# Patient Record
Sex: MALE | Race: WHITE | NOT HISPANIC OR LATINO | URBAN - METROPOLITAN AREA
[De-identification: names, ages, dates, MRNs, and addresses within clinical notes are randomized per-mention and may not be internally consistent; named-entity substitution may affect disease eponyms.]

---

## 2022-11-21 PROBLEM — F31.32 BIPOLAR I DISORDER, MOST RECENT EPISODE DEPRESSED, MODERATE (HCC): Status: ACTIVE | Noted: 2022-11-21

## 2022-11-21 PROBLEM — F17.200 TOBACCO USE DISORDER: Status: ACTIVE | Noted: 2022-11-21

## 2022-11-21 PROBLEM — F41.1 GENERALIZED ANXIETY DISORDER: Status: ACTIVE | Noted: 2022-11-21

## 2022-11-21 RX ORDER — QUETIAPINE FUMARATE 200 MG/1
200 TABLET, FILM COATED ORAL
COMMUNITY
End: 2022-11-23 | Stop reason: SDUPTHER

## 2022-11-21 RX ORDER — CLONAZEPAM 2 MG/1
2 TABLET ORAL 2 TIMES DAILY
COMMUNITY
End: 2022-11-23 | Stop reason: SDUPTHER

## 2022-11-23 ENCOUNTER — TELEMEDICINE (OUTPATIENT)
Dept: PSYCHIATRY | Facility: CLINIC | Age: 52
End: 2022-11-23

## 2022-11-23 DIAGNOSIS — F17.200 TOBACCO USE DISORDER: ICD-10-CM

## 2022-11-23 DIAGNOSIS — F41.1 GENERALIZED ANXIETY DISORDER: ICD-10-CM

## 2022-11-23 DIAGNOSIS — F31.32 BIPOLAR I DISORDER, MOST RECENT EPISODE DEPRESSED, MODERATE (HCC): Primary | ICD-10-CM

## 2022-11-23 RX ORDER — QUETIAPINE FUMARATE 200 MG/1
TABLET, FILM COATED ORAL
Qty: 180 TABLET | Refills: 1 | Status: SHIPPED | OUTPATIENT
Start: 2022-11-23

## 2022-11-23 RX ORDER — CLONAZEPAM 2 MG/1
TABLET ORAL
Qty: 90 TABLET | Refills: 4 | Status: SHIPPED | OUTPATIENT
Start: 2022-11-23

## 2022-11-23 NOTE — BH TREATMENT PLAN
TREATMENT PLAN (Medication Management Only)        [facility]    Name and Date of Birth:  Lauralyn Bernheim 45 y o  1970  Date of Treatment Plan: November 23, 2022  Diagnosis/Diagnoses:    1  Bipolar I disorder, most recent episode depressed, moderate ([address] )    2  Generalized anxiety disorder    3  Tobacco use disorder      Strengths/Personal Resources for Self-Care: supportive family, supportive friends, taking medications as prescribed, ability to adapt to life changes, ability to communicate needs, ability to communicate well, ability to listen, ability to reason, ability to understand psychiatric illness, average or above intelligence  Area/Areas of need (in own words): mood swings  1  Long Term Goal: maintain mood stability  Target Date:6 months - 5/23/2023  Person/Persons responsible for completion of goal: Yusuf  2  Short Term Objective (s) - How will we reach this goal?:   A  Provider new recommended medication/dosage changes and/or continue medication(s): continue current medications as prescribed Seroquel, Klonopin  B  N/A   C  N/A  Target Date:6 months - 5/23/2023  Person/Persons Responsible for Completion of Goal: Kelly Sanders  Progress Towards Goals: stable  Treatment Modality: medication management every 4 months  Review due 180 days from date of this plan: 6 months - 5/23/2023  Expected length of service: maintenance  My Physician/PA/NP and I have developed this plan together and I agree to work on the goals and objectives  I understand the treatment goals that were developed for my treatment

## 2022-11-23 NOTE — PATIENT INSTRUCTIONS
Continue Current Tx  This was via phone as he could not properly connect to [address] [place] [address]  Report Problems  Return 4 months

## 2023-03-15 ENCOUNTER — TELEMEDICINE (OUTPATIENT)
Dept: PSYCHIATRY | Facility: CLINIC | Age: 53
End: 2023-03-15

## 2023-03-15 DIAGNOSIS — F17.200 TOBACCO USE DISORDER: ICD-10-CM

## 2023-03-15 DIAGNOSIS — F31.32 BIPOLAR I DISORDER, MOST RECENT EPISODE DEPRESSED, MODERATE (HCC): ICD-10-CM

## 2023-03-15 DIAGNOSIS — F41.1 GENERALIZED ANXIETY DISORDER: Primary | ICD-10-CM

## 2023-03-15 RX ORDER — QUETIAPINE FUMARATE 200 MG/1
TABLET, FILM COATED ORAL
Qty: 180 TABLET | Refills: 1 | Status: SHIPPED | OUTPATIENT
Start: 2023-03-15

## 2023-03-15 RX ORDER — CLONAZEPAM 2 MG/1
TABLET ORAL
Qty: 90 TABLET | Refills: 4 | Status: SHIPPED | OUTPATIENT
Start: 2023-03-15

## 2023-03-15 NOTE — PSYCH
Virtual Regular Visit    Verification of patient location: at home    Patient is located in the following state in which I hold an active license [place]      Assessment/Plan:       Diagnoses and all orders for this visit:    Generalized anxiety disorder  -     clonazePAM (KlonoPIN) 2 mg tablet; Take 1 PO TID  -     QUEtiapine (SEROquel) 200 mg tablet; Take 1 PO BID    Bipolar I disorder, most recent episode depressed, moderate (HCC)  -     clonazePAM (KlonoPIN) 2 mg tablet; Take 1 PO TID  -     QUEtiapine (SEROquel) 200 mg tablet; Take 1 PO BID    Tobacco use disorder          Goals addressed in session:   Good Health  Counseling provided:      Treatment Recommendations- Risks Benefits       Immediate Medical/Psychiatric/Psychotherapy Treatments and Any Precautions:     Risks, Benefits And Possible Side Effects Of Medications:  Risks, benefits, and possible side effects of medications explained to patient and patient verbalizes understanding    Controlled Medication Discussion: The patient has been filling controlled prescriptions on time as prescribed to [facility]      Reason for visit is No chief complaint on file  Medication Management     Encounter provider NIC Claudio    Provider located at [address] [place] [address]  [phone]      Recent Visits  No visits were found meeting these conditions  Showing recent visits within past 7 days and meeting all other requirements  Today's Visits  Date Type Provider Dept   03/15/23 Telemedicine Kaycee Freedman [facility] today's visits and meeting all other requirements  Future Appointments  No visits were found meeting these conditions  Showing future appointments within next 150 days and meeting all other requirements       The patient was identified by name and date of birth   Estephania Recinos was informed that this is a telemedicine visit and that [facility]  He agrees to proceed     My office door was closed  No one else was in the room  He acknowledged consent and understanding of privacy and security of the video platform  The patient has agreed to participate and understands they can discontinue the visit at any time  Patient is aware this is a billable service  Subjective    Andrew Bae is a 45 y o  male    Here today for a med check  This was via Amwell  normal appetite      HPI  Mood good  Stable   Anxiety manageable  No problems with medication  When asked any ETOH he replied "I am fine"    Appetite Sleep good  Health OK  Denies SI/HI    No past medical history on file  No past surgical history on file  Current Outpatient Medications   Medication Sig Dispense Refill   • clonazePAM (KlonoPIN) 2 mg tablet Take 1 PO TID 90 tablet 4   • QUEtiapine (SEROquel) 200 mg tablet Take 1 PO  tablet 1     No current facility-administered medications for this visit  Not on File    Social History     Substance and Sexual Activity   Drug Use Not on file       No family history on file          Objective    Mental status:  Appearance calm and cooperative , adequate hygiene and grooming and good eye contact    Mood mood appropriate   Affect affect appropriate    Speech a normal rate and fluent   Thought Processes normal thought processes   Hallucinations no hallucinations present    Thought Content no delusions   Abnormal Thoughts no suicidal thoughts  and no homicidal thoughts    Orientation  oriented to person and place and time   Remote Memory short term memory intact and long term memory intact   Attention Span concentration intact   Intellect Appears to be of Average Intelligence   Insight Insight intact   Judgement judgment was intact   Muscle Strength Muscle strength and tone were normal and Normal gait    Language no difficulty naming common objects   Fund of Knowledge displays adequate knowledge of current events   Pain none   Pain Scale 0       Video Exam    There were no vitals filed for this visit      I spent 15 minutes directly with the patient during this visit    Patient Instructions   Continue Current Tx  Report Problems  Return 4 months       Visit Time    Visit Start Time: 8154  Visit Stop Time: 0818  Total Visit Duration: 12 min

## 2023-03-15 NOTE — BH TREATMENT PLAN
TREATMENT PLAN (Medication Management Only)        [facility]    Name and Date of Birth:  Miesha Jaeger 45 y o  1970  Date of Treatment Plan: March 15, 2023  Diagnosis/Diagnoses:    1  Generalized anxiety disorder    2  Bipolar I disorder, most recent episode depressed, moderate ([address] )    3  Tobacco use disorder      Strengths/Personal Resources for Self-Care: supportive family, supportive friends, taking medications as prescribed  Area/Areas of need (in own words): mood swings  1  Long Term Goal: maintain mood stability  Target Date:6 months - 9/15/2023  Person/Persons responsible for completion of goal: Malvin Bhandari, family  2  Short Term Objective (s) - How will we reach this goal?:   A  Provider new recommended medication/dosage changes and/or continue medication(s): continue current medications as prescribed Seroquel, Klonopin  B  N/A   C  N/A  Target Date:6 months - 9/15/2023  Person/Persons Responsible for Completion of Goal: Malvin Bhandari, family  Progress Towards Goals: stable  Treatment Modality: medication management every 4 months  Review due 180 days from date of this plan: 6 months - 9/15/2023  Expected length of service: maintenance  My Physician/PA/NP and I have developed this plan together and I agree to work on the goals and objectives  I understand the treatment goals that were developed for my treatment

## 2023-06-13 ENCOUNTER — DOCUMENTATION (OUTPATIENT)
Dept: PSYCHIATRY | Facility: CLINIC | Age: 53
End: 2023-06-13

## 2023-06-13 NOTE — PSYCH
[facility]    Patient Name Lex Joy     Date of Birth: 45 y o  1970      MRN: [id number]    Admission Date: several years ago    Date of Transfer: June 13, 2023    Admission Diagnosis:     Bipolar Disorder, type I, depressed, moderate  Generalized Anxiety Disorder    Current Diagnosis:     No diagnosis found  Reason for Admission: Modesto Leslie presented for treatment due to anxiety and mood instability  Primary complaints included ANXIETY SYMPTOMS: unremarkable and MOOD INSTABILITY SYMPTOMS: unremarkable  Progress in Treatment: Modesto Leslie was seen for Medication Management  During the course of treatment he      Episodes of Higher Level of Care: No    Transfer request Initiated by: Psychiatrist: Nurse Practitioner Porter Segal Therapist: None    Reason for Transfer Request: clinician leaving practice    Does this individual need a clinician with specialized training/expertise?: No    Is this client working with any other [facility] Providers/Therapists?  Psychiatrist: Nurse Practitioner Porter Segal Therapist: None    Other pertinent issues: None    Are there any specific individuals who would be a “best fit” or who have already agreed to accept this transfer request?      Psychiatrist: None   Therapist: None  Rationale: Not Applicable    Attempts to maintain the current therapeutic relationship: Not Applicable    Transfer request routed to Clinical Coordinator for input and/or approval      Comments from other involved providers and/or clinical coordinator: None    Porter Segal, CRNP06/13/23

## 2023-06-16 ENCOUNTER — TELEPHONE (OUTPATIENT)
Dept: PSYCHIATRY | Facility: CLINIC | Age: 53
End: 2023-06-16

## 2023-06-16 NOTE — TELEPHONE ENCOUNTER
Contacted client about cancelling 7/5/2023 appt  with NIC Blanton due to him retiring  We are in the process of hiring new providers within our practice  Once we have their schedules, we will contact you to reschedule your appt  Please call [phone] to request refills as needed

## 2023-06-21 ENCOUNTER — TELEMEDICINE (OUTPATIENT)
Dept: PSYCHIATRY | Facility: CLINIC | Age: 53
End: 2023-06-21

## 2023-06-21 ENCOUNTER — DOCUMENTATION (OUTPATIENT)
Dept: PSYCHIATRY | Facility: CLINIC | Age: 53
End: 2023-06-21

## 2023-06-21 DIAGNOSIS — F31.32 BIPOLAR I DISORDER, MOST RECENT EPISODE DEPRESSED, MODERATE (HCC): ICD-10-CM

## 2023-06-21 DIAGNOSIS — F41.1 GENERALIZED ANXIETY DISORDER: Primary | ICD-10-CM

## 2023-06-21 DIAGNOSIS — F17.200 TOBACCO USE DISORDER: ICD-10-CM

## 2023-06-21 PROCEDURE — 99214 OFFICE O/P EST MOD 30 MIN: CPT | Performed by: NURSE PRACTITIONER

## 2023-06-21 RX ORDER — QUETIAPINE FUMARATE 200 MG/1
TABLET, FILM COATED ORAL
Qty: 180 TABLET | Refills: 1 | Status: SHIPPED | OUTPATIENT
Start: 2023-06-21

## 2023-06-21 RX ORDER — CLONAZEPAM 2 MG/1
TABLET ORAL
Qty: 90 TABLET | Refills: 5 | Status: SHIPPED | OUTPATIENT
Start: 2023-06-21

## 2023-06-21 NOTE — PSYCH
Virtual Regular Visit    Verification of patient location: at home    Patient is located in the following state in which I hold an active license [place]      Assessment/Plan:       Diagnoses and all orders for this visit:    Generalized anxiety disorder  -     QUEtiapine (SEROquel) 200 mg tablet; Take 1 PO BID  -     clonazePAM (KlonoPIN) 2 mg tablet; Take 1 PO TID    Bipolar I disorder, most recent episode depressed, moderate (HCC)  -     QUEtiapine (SEROquel) 200 mg tablet; Take 1 PO BID  -     clonazePAM (KlonoPIN) 2 mg tablet; Take 1 PO TID    Tobacco use disorder            Goals addressed in session:   Good Health  Counseling provided:      Treatment Recommendations- Risks Benefits       Immediate Medical/Psychiatric/Psychotherapy Treatments and Any Precautions:     Risks, Benefits And Possible Side Effects Of Medications:  Risks, benefits, and possible side effects of medications explained to patient and patient verbalizes understanding    Controlled Medication Discussion: The patient has been filling controlled prescriptions on time as prescribed to [facility]      Reason for visit is No chief complaint on file  Medication Management    Encounter provider NIC Lomeli    Provider located at [address] [address]  [phone]      Recent Visits  Date Type Provider Dept   06/16/23 Telephone [facility] recent visits within past 7 days and meeting all other requirements  Today's Visits  Date Type Provider Dept   06/21/23 Telemedicine [facility] today's visits and meeting all other requirements  Future Appointments  No visits were found meeting these conditions    Showing future appointments within next 150 days and meeting all other requirements       The patient was identified by name and date of birth  Francesca Gordon was informed that this is a telemedicine visit and that the [facility]  He agrees to proceed     My office door was closed  No one else was in the room  He acknowledged consent and understanding of privacy and security of the video platform  The patient has agreed to participate and understands they can discontinue the visit at any time  Patient is aware this is a billable service  Emelyn Gordon is a 45 y o  male    Here today for a med check  This was via [facility] and the phone    normal appetite      HPI  Mood good  Denies anxiety   No problems with medication  Appetite Sleep good  Health OK  Denies SI/HI    No past medical history on file  No past surgical history on file  Current Outpatient Medications   Medication Sig Dispense Refill   • clonazePAM (KlonoPIN) 2 mg tablet Take 1 PO TID 90 tablet 5   • QUEtiapine (SEROquel) 200 mg tablet Take 1 PO  tablet 1     No current facility-administered medications for this visit  Not on File    Social History     Substance and Sexual Activity   Drug Use Not on file       No family history on file          Objective    Mental status:  Appearance calm and cooperative  and adequate hygiene and grooming   Mood mood appropriate   Affect affect appropriate    Speech a normal rate and fluent   Thought Processes normal thought processes   Hallucinations no hallucinations present    Thought Content no delusions   Abnormal Thoughts no suicidal thoughts  and no homicidal thoughts    Orientation  oriented to person and place and time   Remote Memory short term memory intact and long term memory intact   Attention Span concentration intact   Intellect Appears to be of Average Intelligence   Insight Insight intact   Judgement judgment was intact   Muscle Strength Muscle strength and tone were normal and Normal gait    Language no difficulty naming common objects   Fund of Knowledge displays adequate knowledge of current events   Pain none   Pain Scale 0       Video Exam    There were no vitals filed for this visit       I spent 15 minutes directly with the patient during this visit    Patient Instructions   Continue Current Tx  Report Problems  Given # of ACM Team to help with Neisha Rosales and My Chart  Return 4 months       Visit Time    Visit Start Time: 3519  Visit Stop Time: 0926  Total Visit Duration: 22 min

## 2023-06-21 NOTE — BH TREATMENT PLAN
TREATMENT PLAN (Medication Management Only)        [facility]    Name and Date of Birth:  Ladonna Lujan 45 y o  1970  Date of Treatment Plan: June 21, 2023  Diagnosis/Diagnoses:    1  Generalized anxiety disorder    2  Bipolar I disorder, most recent episode depressed, moderate ([address] )    3  Tobacco use disorder      Strengths/Personal Resources for Self-Care: supportive family, supportive friends, taking medications as prescribed  Area/Areas of need (in own words): anxiety, mood instability  1  Long Term Goal: maintain mood stability  Target Date:6 months - 12/21/2023  Person/Persons responsible for completion of goal: Yusuf  2  Short Term Objective (s) - How will we reach this goal?:   A  Provider new recommended medication/dosage changes and/or continue medication(s): continue current medications as prescribed Seroquel, Klonopin  B  N/A   C  N/A  Target Date:6 months - 12/21/2023  Person/Persons Responsible for Completion of Goal: Nettie Elizondo  Progress Towards Goals: stable  Treatment Modality: medication management every 4 months  Review due 180 days from date of this plan: 6 months - 12/21/2023  Expected length of service: maintenance  My Physician/PA/NP and I have developed this plan together and I agree to work on the goals and objectives  I understand the treatment goals that were developed for my treatment

## 2023-06-21 NOTE — PATIENT INSTRUCTIONS
Continue Current Tx  Report Problems  Given # of ACM Team to help with Amwell and My Chart  Return 4 months

## 2023-06-21 NOTE — PSYCH
[facility]    Patient Name Kailyn Garzon     Date of Birth: 45 y o  1970      MRN: [id number]    Admission Date: several years ago    Date of Transfer: June 21, 2023    Admission Diagnosis:     Bipolar  Most recent depressed moderate  Generalized Anxiety Disorder    Current Diagnosis:     No diagnosis found  Reason for Admission: Cathleen Gallo presented for treatment due to anxiety and mood instability  Primary complaints included DEPRESSIVE SYMPTOMS: unremarkable - euthymic mood and MOOD INSTABILITY SYMPTOMS: unremarkable  Progress in Treatment: Cathleen Gallo was seen for Medication Management  During the course of treatment he reports mood and anxiety good  Episodes of Higher Level of Care: No    Transfer request Initiated by: Psychiatrist: Nurse Practitioner Azul Lopez Therapist: None    Reason for Transfer Request: clinician leaving practice    Does this individual need a clinician with specialized training/expertise?: No    Is this client working with any other [facility] Providers/Therapists?  Psychiatrist: None Therapist: None    Other pertinent issues: None    Are there any specific individuals who would be a “best fit” or who have already agreed to accept this transfer request?      Psychiatrist: None   Therapist: None  Rationale: Not Applicable    Attempts to maintain the current therapeutic relationship: Not Applicable    Transfer request routed to Clinical Coordinator for input and/or approval      Comments from other involved providers and/or clinical coordinator: None    Azul Lopez, CRNP06/21/23

## 2023-08-07 ENCOUNTER — TELEPHONE (OUTPATIENT)
Dept: PSYCHIATRY | Facility: CLINIC | Age: 53
End: 2023-08-07

## 2023-08-07 DIAGNOSIS — F31.32 BIPOLAR I DISORDER, MOST RECENT EPISODE DEPRESSED, MODERATE (HCC): ICD-10-CM

## 2023-08-07 DIAGNOSIS — F41.1 GENERALIZED ANXIETY DISORDER: ICD-10-CM

## 2023-08-07 RX ORDER — QUETIAPINE FUMARATE 200 MG/1
TABLET, FILM COATED ORAL
Qty: 30 TABLET | Refills: 1 | Status: SHIPPED | OUTPATIENT
Start: 2023-08-07 | End: 2023-08-16 | Stop reason: SDUPTHER

## 2023-08-07 NOTE — TELEPHONE ENCOUNTER
Pt .Tala Baird, 1970 , Please call Gabrielle Cain to schedule appointment at [address] [place] [address] within the next 2 months.       [facility]     No, This note was not shared with the patient due to reasonable likelihood of causing patient harm

## 2023-08-07 NOTE — TELEPHONE ENCOUNTER
Pharmacy reporting that seroquel script was canceled. Called pharmacy to discuss. They state they received a cancellation on his seroquel and never rec'd the script that was sent on 6/21. Provider has since retired. Forwarding for review.

## 2023-08-07 NOTE — TELEPHONE ENCOUNTER
Clt called pharmacy to initiate refill for Seroquel. States pharmacy told him that prescription has been cancelled by prescriber. Confused as to why the pharmacy told him this. Writer advised she will send message to clinical team to look into medication refill of Seroquel. Clt requested a call back to update what is going on.

## 2023-08-16 NOTE — TELEPHONE ENCOUNTER
This was filled as daily but per Ángel's last note, he takes it bid    Script corrected, can you sign off on it please

## 2023-08-17 DIAGNOSIS — F31.32 BIPOLAR I DISORDER, MOST RECENT EPISODE DEPRESSED, MODERATE (HCC): ICD-10-CM

## 2023-08-17 DIAGNOSIS — F41.1 GENERALIZED ANXIETY DISORDER: ICD-10-CM

## 2023-08-17 RX ORDER — QUETIAPINE FUMARATE 200 MG/1
TABLET, FILM COATED ORAL
Qty: 60 TABLET | Refills: 1 | OUTPATIENT
Start: 2023-08-17

## 2023-08-17 RX ORDER — QUETIAPINE FUMARATE 200 MG/1
TABLET, FILM COATED ORAL
Qty: 60 TABLET | Refills: 1 | Status: SHIPPED | OUTPATIENT
Start: 2023-08-17

## 2023-08-25 ENCOUNTER — TELEPHONE (OUTPATIENT)
Dept: PSYCHIATRY | Facility: CLINIC | Age: 53
End: 2023-08-25

## 2023-08-25 NOTE — TELEPHONE ENCOUNTER
Spoke to client regarding scheduling with a new provider. Erik has insurance through another county, which we do not except. Jasont stated that he paid privately for his last apt. Sent client a link to set up a my chart. Clt using mom's email address as he doesn't have one. Sent IB message to billing to review that we are able to see this client, due to circumstances with insurance.

## 2023-10-06 ENCOUNTER — TELEMEDICINE (OUTPATIENT)
Dept: PSYCHIATRY | Facility: CLINIC | Age: 53
End: 2023-10-06

## 2023-10-06 DIAGNOSIS — F41.1 GENERALIZED ANXIETY DISORDER: ICD-10-CM

## 2023-10-06 DIAGNOSIS — F31.32 BIPOLAR I DISORDER, MOST RECENT EPISODE DEPRESSED, MODERATE (HCC): Primary | ICD-10-CM

## 2023-10-06 PROCEDURE — 99214 OFFICE O/P EST MOD 30 MIN: CPT | Performed by: PSYCHIATRY & NEUROLOGY

## 2023-10-06 RX ORDER — QUETIAPINE FUMARATE 200 MG/1
200 TABLET, FILM COATED ORAL 2 TIMES DAILY
Qty: 60 TABLET | Refills: 2 | Status: SHIPPED | OUTPATIENT
Start: 2023-10-06

## 2023-10-06 RX ORDER — CLONAZEPAM 2 MG/1
2 TABLET ORAL 3 TIMES DAILY
Qty: 90 TABLET | Refills: 2 | Status: SHIPPED | OUTPATIENT
Start: 2023-10-06

## 2023-10-06 NOTE — PSYCH
This was a Telepsychiatry visit that took place through [address] [place] [address]. The patient's identity was verified via name and date of birth. Patient expressed understanding, and voluntarily agreed to proceed with the visit. This visit occurred at the address listed in the patient's chart as the patient's residence. Chief Complaint:    Bipolar depression and anxiety    Interval Summary    Pt said that he has been doing well since his last visit few months ago. He denied any depression or anxiety for the last few weeks. He denied any stressors currently. He said he has not worked since 2010 and is on disability. He lives with his mother. He said that he goes to the gym, watch TV, and helps mom with stuff around the house. He does PT twice a week for back pain. Psychiatric ROS    Sleep: Good  Appetite: Good  Suicidal ideation: Denied  Homicidal ideation: Denied  Psychosis: None reported  Nelly: None reported  PTSD: None reported  Panic attacks: None reported  Memory issues: None reported  Side effects: None reported    D+A Use    Alcohol: None  Nicotine: Smokes 1/2 pack of cigarettes since he was 13years old  Other substances: None    Medical Issues  Acute medical issues at present: None  Last PCP Visit: 1 month ago, no issues    Medical Review of Systems    No fever/dizziness/blurred vision/cough/chest pain/shortness of breath/abdominal pain/nausea/vomiting/bladder or bowel problems/headache/weakness/rigidity or pain reported. Any symptoms reported by patient are listed in the interval summary.     Mental Status Exam    Motor: Normal gait and no abnormal movements  General appearance and behavior: Moderately kempt, calm, cooperative, pleasant, good eye contact, good rapport, no psychomotor abnormalities noted  Speech: Spontaneous with normal rate, normal volume and normal tone  Mood: "good"  Affect: Euthymic, congruent with mood, normal range, appropriate  Thought Process: linear  Thought content: Denied suicidal or homicidal ideation. No delusions elicited  Perception: Denied any auditory or visual hallucinations  Insight: Good  Judgment: Good  Orientation: Oriented to person, place and time  Attention/Concentration: Good  Memory - Grossly intact  Language - [address] of Knowledge - Adequate    Assessment and Plan    Bipolar I disorder, most recent episode depressed, moderate - Continue quetiapine 200 mg tablet PO BID    Generalized anxiety disorder - Continue clonazepam 2 mg PO TID    D/w pt about the risks of dependence, tolerance with klonopin and also the risk of falls and imbalance. He agreed to reduce the dose slowly.       The clinical diagnosis, course and prognosis were explained to the patient. Discussed with patient the clinical indications, interactions, benefits, the most common and serious side effects of all current medications. The alternative treatment options were discussed. The importance of continuing in psychotherapy was reinstated. The patient was receptive and appeared to understand the information provided. Patient's concerns and questions were addressed to patient's satisfaction during the appointment, and the patient agreed to the treatment plan. Patient is aware of adverse effects of use of alcohol and/or other substances on mental illness and is aware of the risk of combining alcohol and/or substances with the medications that are currently prescribed. The patient was advised go to the nearest emergency room or call 911 if new symptoms arise or existing symptoms worsen or has thoughts of hurting self or others.     Follow up in 8 weeks

## 2023-12-08 ENCOUNTER — TELEMEDICINE (OUTPATIENT)
Dept: PSYCHIATRY | Facility: CLINIC | Age: 53
End: 2023-12-08

## 2023-12-08 DIAGNOSIS — F17.200 TOBACCO USE DISORDER: ICD-10-CM

## 2023-12-08 DIAGNOSIS — F31.32 BIPOLAR I DISORDER, MOST RECENT EPISODE DEPRESSED, MODERATE (HCC): Primary | ICD-10-CM

## 2023-12-08 DIAGNOSIS — F41.1 GENERALIZED ANXIETY DISORDER: ICD-10-CM

## 2023-12-08 PROCEDURE — 99213 OFFICE O/P EST LOW 20 MIN: CPT | Performed by: PSYCHIATRY & NEUROLOGY

## 2023-12-08 RX ORDER — QUETIAPINE FUMARATE 200 MG/1
200 TABLET, FILM COATED ORAL 2 TIMES DAILY
Qty: 60 TABLET | Refills: 3 | Status: SHIPPED | OUTPATIENT
Start: 2023-12-08

## 2023-12-08 RX ORDER — CLONAZEPAM 2 MG/1
2 TABLET ORAL 3 TIMES DAILY
Qty: 90 TABLET | Refills: 3 | Status: SHIPPED | OUTPATIENT
Start: 2023-12-08

## 2023-12-08 NOTE — PSYCH
After connecting through [employer], patient was verified with two unique identifiers. Patient (or authorized legal representative) was then informed that this was a Telemedicine visit and that the exam was being conducted confidentially over secure lines. My office door was closed. No one else was in the room with me. Patient acknowledged consent and understanding of privacy and security of the Telemedicine visit, and gave permission to have a telemedicine presenter stay in the room in order to assist with the history and to conduct the exam as needed. I informed the patient that I have reviewed their record in Epic and presented the opportunity for them to ask any questions regarding the visit today. The patient agreed to participate. Chief Complaint:    Bipolar depression and anxiety    Interval Summary    Pt said that he has been doing well since his last visit. He denied any depression or anxiety. He said they had their roof replaced 2 days ago and that was stressful. He denied any other stressors. Psychiatric ROS    Sleep: Good  Appetite: Good  Suicidal ideation: Denied  Homicidal ideation: Denied  Psychosis: None reported  Nelly: None reported  PTSD: None reported  Panic attacks: None reported  Memory issues: None reported  Side effects: None reported    D+A Use    Alcohol: None  Nicotine: Smokes 1/2 pack of cigarettes since he was 13years old  Other substances: None    Medical Issues  Acute medical issues at present: None  Last PCP Visit: 3 months ago, no issues    Medical Review of Systems    No fever/dizziness/blurred vision/cough/chest pain/shortness of breath/abdominal pain/nausea/vomiting/bladder or bowel problems/headache/weakness/rigidity or pain reported. Any symptoms reported by patient are listed in the interval summary.     Mental Status Exam    Motor: Normal gait and no abnormal movements  General appearance and behavior: Moderately kempt, calm, cooperative, pleasant, good eye contact, good rapport, no psychomotor abnormalities noted  Speech: Spontaneous with normal rate, normal volume and normal tone  Mood: "good"  Affect: Euthymic, congruent with mood, normal range, appropriate  Thought Process: linear  Thought content: Denied suicidal or homicidal ideation. No delusions elicited  Perception: Denied any auditory or visual hallucinations  Insight: Good  Judgment: Good  Orientation: Oriented to person, place and time  Attention/Concentration: Good  Memory - Grossly intact  Language - [address] of Knowledge - Adequate    Assessment and Plan    Bipolar I disorder, most recent episode depressed, moderate - Continue quetiapine 200 mg tablet PO BID    Generalized anxiety disorder - Continue clonazepam 2 mg PO TID    D/w pt about the risks of dependence, tolerance with klonopin and also the risk of falls and imbalance. He agreed to reduce the dose slowly. The clinical diagnosis, course and prognosis were explained to the patient. Discussed with patient the clinical indications, interactions, benefits, the most common and serious side effects of all current medications. The alternative treatment options were discussed. The importance of continuing in psychotherapy was reinstated. The patient was receptive and appeared to understand the information provided. Patient's concerns and questions were addressed to patient's satisfaction during the appointment, and the patient agreed to the treatment plan. Patient is aware of adverse effects of use of alcohol and/or other substances on mental illness and is aware of the risk of combining alcohol and/or substances with the medications that are currently prescribed. The patient was advised go to the nearest emergency room or call 911 if new symptoms arise or existing symptoms worsen or has thoughts of hurting self or others.     Behavioral Health OP Treatment Plan    Name and Date of Birth:  Moisés Oliveira 52 y.o. 1970    Date of Treatment Plan: December 8, 2023    Diagnosis/Diagnoses:     1. Bipolar I disorder, most recent episode depressed, moderate ([address])    2. Generalized anxiety disorder    3. Tobacco use disorder        Strengths/Personal Resources for Self-Care: Good insight, compliant with treatment, good family support    Area/Areas of need (in own words):    1. Long Term Goal: Management of symptoms  Target Date: 6 months  Person/Persons responsible for completion of goal: Patient    2. Short Term Objective(s): How will we reach this goal - Continue medications as prescribed  Target Date: 6 months  Person/Persons Responsible for Completion of Goal: Patient    Progress Towards Goals: Stable    Treatment Modality: Medication management every two months    Review due in 180 days from the date of this plan    Expected length of service: Follow up appointment every 1-2 months for maintenance    My Physician/PA/NP and I have developed this plan together and I agree to work on the goals and objectives. I understand the treatment goals that were developed for my treatment. I consent to the above treatment plan.     Follow up in 12 weeks    Start time - 600 PM    Stop time - 617 pm

## 2024-03-08 ENCOUNTER — TELEMEDICINE (OUTPATIENT)
Dept: PSYCHIATRY | Facility: CLINIC | Age: 54
End: 2024-03-08

## 2024-03-08 DIAGNOSIS — F17.200 TOBACCO USE DISORDER: ICD-10-CM

## 2024-03-08 DIAGNOSIS — F31.32 BIPOLAR I DISORDER, MOST RECENT EPISODE DEPRESSED, MODERATE (HCC): Primary | ICD-10-CM

## 2024-03-08 DIAGNOSIS — F41.1 GENERALIZED ANXIETY DISORDER: ICD-10-CM

## 2024-03-08 PROCEDURE — 99213 OFFICE O/P EST LOW 20 MIN: CPT | Performed by: PSYCHIATRY & NEUROLOGY

## 2024-03-08 RX ORDER — QUETIAPINE FUMARATE 200 MG/1
200 TABLET, FILM COATED ORAL 2 TIMES DAILY
Qty: 60 TABLET | Refills: 3 | Status: SHIPPED | OUTPATIENT
Start: 2024-03-08

## 2024-03-08 RX ORDER — CLONAZEPAM 2 MG/1
2 TABLET ORAL 3 TIMES DAILY
Qty: 90 TABLET | Refills: 3 | Status: SHIPPED | OUTPATIENT
Start: 2024-03-08

## 2024-03-08 NOTE — PSYCH
After connecting through [employer], patient was verified with two unique identifiers.  Patient (or authorized legal representative) was then informed that this was a Telemedicine visit and that the exam was being conducted confidentially over secure lines. My office door was closed.  No one else was in the room with me.  Patient acknowledged consent and understanding of privacy and security of the Telemedicine visit, and gave permission to have a telemedicine presenter stay in the room in order to assist with the history and to conduct the exam as needed.  I informed the patient that I have reviewed their record in Epic and presented the opportunity for them to ask any questions regarding the visit today.  The patient agreed to participate.    Chief Complaint:    Bipolar depression and anxiety    Interval Summary    Pt said that he has been doing fine since his last visit. He denied feeling depressed or anxious. He goes to gym every day and he feels better. He denied any current stressors. He said that his insurance is not paying for his visits.     Psychiatric ROS    Sleep: Good, 7-10 hours, feels rested  Appetite: Good  Suicidal ideation: Denied  Homicidal ideation: Denied  Psychosis: None reported  Nelly: None reported  PTSD: None reported  Panic attacks: None reported  Memory issues: None reported  Side effects: None reported    D+A Use    Alcohol: None  Nicotine: Smokes 3/4 pack of cigarettes since he was 10 years old  Other substances: None    Medical Issues  Acute medical issues at present: None  Last PCP Visit: 6 months ago, no issues    Medical Review of Systems    No fever/dizziness/blurred vision/cough/chest pain/shortness of breath/abdominal pain/nausea/vomiting/bladder or bowel problems/headache/weakness/rigidity or pain reported. Any symptoms reported by patient are listed in the interval summary.    Mental Status Exam    Motor: Normal gait and no abnormal movements  General appearance and behavior:  "Moderately kempt, calm, cooperative, pleasant, good eye contact, good rapport, no psychomotor abnormalities noted  Speech: Spontaneous with normal rate, normal volume and normal tone  Mood: \"good\"  Affect: Euthymic, congruent with mood, normal range, appropriate  Thought Process: linear  Thought content: Denied suicidal or homicidal ideation. No delusions elicited  Perception: Denied any auditory or visual hallucinations  Insight: Good  Judgment: Good  Orientation: Oriented to person, place and time  Attention/Concentration: Good  Memory - Grossly intact  Language - Fluent  Fund of Knowledge - Adequate    Assessment and Plan    Bipolar I disorder, most recent episode depressed, moderate - Continue quetiapine 200 mg tablet PO BID    Generalized anxiety disorder - Continue clonazepam 2 mg PO TID    D/w pt about the risks of dependence, tolerance with klonopin and also the risk of falls and imbalance. He agreed to reduce the dose slowly.     The clinical diagnosis, course and prognosis were explained to the patient. Discussed with patient the clinical indications, interactions, benefits, the most common and serious side effects of all current medications. The alternative treatment options were discussed. The importance of continuing in psychotherapy was reinstated. The patient was receptive and appeared to understand the information provided. Patient's concerns and questions were addressed to patient's satisfaction during the appointment, and the patient agreed to the treatment plan.    Patient is aware of adverse effects of use of alcohol and/or other substances on mental illness and is aware of the risk of combining alcohol and/or substances with the medications that are currently prescribed. The patient was advised go to the nearest emergency room or call 911 if new symptoms arise or existing symptoms worsen or has thoughts of hurting self or others.    Behavioral Health OP Treatment Plan    Name and Date of Birth:  " Yusuf Lucio 53 y.o. 1970    Date of Treatment Plan: March 8, 2024    Diagnosis/Diagnoses:     1. Bipolar I disorder, most recent episode depressed, moderate (HCC)    2. Generalized anxiety disorder    3. Tobacco use disorder        Strengths/Personal Resources for Self-Care: Good insight, compliant with treatment, good family support    Area/Areas of need (in own words):    1. Long Term Goal: Management of symptoms  Target Date: 6 months  Person/Persons responsible for completion of goal: Patient    2.  Short Term Objective(s):  How will we reach this goal - Continue medications as prescribed  Target Date: 6 months  Person/Persons Responsible for Completion of Goal: Patient    Progress Towards Goals: Stable    Treatment Modality: Medication management every two months    Review due in 180 days from the date of this plan    Expected length of service: Follow up appointment every 1-2 months for maintenance    My Physician/PA/NP and I have developed this plan together and I agree to work on the goals and objectives. I understand the treatment goals that were developed for my treatment. I consent to the above treatment plan.    Follow up in 12 weeks    Start time - 530 PM    Stop time - 540 pm

## 2024-05-31 ENCOUNTER — TELEPHONE (OUTPATIENT)
Dept: PSYCHIATRY | Facility: CLINIC | Age: 54
End: 2024-05-31

## 2024-05-31 ENCOUNTER — TELEMEDICINE (OUTPATIENT)
Dept: PSYCHIATRY | Facility: CLINIC | Age: 54
End: 2024-05-31

## 2024-05-31 DIAGNOSIS — F17.200 TOBACCO USE DISORDER: ICD-10-CM

## 2024-05-31 DIAGNOSIS — F31.32 BIPOLAR I DISORDER, MOST RECENT EPISODE DEPRESSED, MODERATE (HCC): Primary | ICD-10-CM

## 2024-05-31 DIAGNOSIS — F41.1 GENERALIZED ANXIETY DISORDER: ICD-10-CM

## 2024-05-31 PROCEDURE — 99213 OFFICE O/P EST LOW 20 MIN: CPT | Performed by: PSYCHIATRY & NEUROLOGY

## 2024-05-31 RX ORDER — CLONAZEPAM 2 MG/1
2 TABLET ORAL 3 TIMES DAILY
Qty: 90 TABLET | Refills: 3 | Status: SHIPPED | OUTPATIENT
Start: 2024-05-31

## 2024-05-31 RX ORDER — QUETIAPINE FUMARATE 200 MG/1
200 TABLET, FILM COATED ORAL 2 TIMES DAILY
Qty: 60 TABLET | Refills: 3 | Status: SHIPPED | OUTPATIENT
Start: 2024-05-31

## 2024-05-31 NOTE — PSYCH
After connecting through [employer], patient was verified with two unique identifiers. Patient confirmed that they were at Home/Office and were in [place] at the time of the appointment.    Patient (or authorized legal representative) was then informed that this was a Telemedicine visit and that the exam was being conducted confidentially over secure lines. My office door was closed.  No one else was in the room with me.  Patient acknowledged consent and understanding of privacy and security of the Telemedicine visit, and gave permission to have a telemedicine presenter stay in the room in order to assist with the history and to conduct the exam as needed.  I informed the patient that I have reviewed their record in Epic and presented the opportunity for them to ask any questions regarding the visit today.  The patient agreed to participate.    Chief Complaint:    Bipolar depression and anxiety    Interval Summary    Pt said that he has been doing fine since his last visit. He denied feeling depressed or anxious. He said he goes to gym every day and he feels better. He said that he was able to deal with one of the medical bills but the insurance continues to charge for these visits. He denied any current stressors.     Psychiatric ROS    Sleep: Good, 8 hours, feels rested  Appetite: Good  Suicidal ideation: Denied  Homicidal ideation: Denied  Psychosis: None reported  Nelly: None reported  PTSD: None reported  Panic attacks: None reported  Memory issues: None reported  Side effects: None reported    D+A Use    Alcohol: None  Nicotine: Smokes 3/4 pack of cigarettes daily  Other substances: None    Medical Issues  Acute medical issues at present: None  Last PCP Visit: 3 weeks ago, no issues, BP is stable and he is off BP meds    Medical Review of Systems    No fever/dizziness/blurred vision/cough/chest pain/shortness of breath/abdominal pain/nausea/vomiting/bladder or bowel  "problems/headache/weakness/rigidity or pain reported. Any symptoms reported by patient are listed in the interval summary.    Mental Status Exam    Motor: Normal gait and no abnormal movements  General appearance and behavior: Moderately kempt, calm, cooperative, pleasant, good eye contact, good rapport, no psychomotor abnormalities noted  Speech: Spontaneous with normal rate, normal volume and normal tone  Mood: \"good\"  Affect: Euthymic, congruent with mood, normal range, appropriate  Thought Process: linear  Thought content: Denied suicidal or homicidal ideation. No delusions elicited  Perception: Denied any auditory or visual hallucinations  Insight: Good  Judgment: Good  Orientation: Oriented to person, place and time  Attention/Concentration: Good  Memory - Grossly intact  Language - Fluent  Fund of Knowledge - Adequate    Assessment and Plan    Bipolar I disorder, most recent episode depressed, moderate - Continue quetiapine 200 mg tablet PO BID    Generalized anxiety disorder - Continue clonazepam 2 mg PO TID. Advised the patient to take 1 mg in the afternoons. Reduce the dose to 2 mg BID in the next visit.    D/w pt about the risks of dependence, tolerance with klonopin and also the risk of falls and imbalance. He agreed to reduce the dose slowly.     The clinical diagnosis, course and prognosis were explained to the patient. Discussed with patient the clinical indications, interactions, benefits, the most common and serious side effects of all current medications. The alternative treatment options were discussed. The importance of continuing in psychotherapy was reinstated. The patient was receptive and appeared to understand the information provided. Patient's concerns and questions were addressed to patient's satisfaction during the appointment, and the patient agreed to the treatment plan.    Patient is aware of adverse effects of use of alcohol and/or other substances on mental illness and is aware of " the risk of combining alcohol and/or substances with the medications that are currently prescribed. The patient was advised go to the nearest emergency room or call 911 if new symptoms arise or existing symptoms worsen or has thoughts of hurting self or others.    Behavioral Health OP Treatment Plan    Name and Date of Birth:  Yusuf Valdes 53 y.o. 1970    Date of Treatment Plan: May 31, 2024    Diagnosis/Diagnoses:     1. Bipolar I disorder, most recent episode depressed, moderate (HCC)    2. Generalized anxiety disorder    3. Tobacco use disorder        Strengths/Personal Resources for Self-Care: Good insight, compliant with treatment, good family support    Area/Areas of need (in own words):    1. Long Term Goal: Management of symptoms  Target Date: 6 months  Person/Persons responsible for completion of goal: Patient    2.  Short Term Objective(s):  How will we reach this goal - Continue medications as prescribed  Target Date: 6 months  Person/Persons Responsible for Completion of Goal: Patient    Progress Towards Goals: Stable    Treatment Modality: Medication management every two months    Review due in 180 days from the date of this plan    Expected length of service: Follow up appointment every 1-2 months for maintenance    My Physician/PA/NP and I have developed this plan together and I agree to work on the goals and objectives. I understand the treatment goals that were developed for my treatment. I consent to the above treatment plan.    Follow up in 12 weeks    Start time - 350 pm    Stop time - 403 pm

## 2024-05-31 NOTE — TELEPHONE ENCOUNTER
Client requested a call back to sort out appointment follow up date. Client says call from  and what he was told by provider does not match.     Writer told client he would notify provider's  and confirmed 's extension for next week.

## 2024-06-03 NOTE — TELEPHONE ENCOUNTER
He can get a follow up appt when he prefers to have it. I might have not written the correct follow up date in my note. Hope this helps

## 2024-08-20 ENCOUNTER — TELEMEDICINE (OUTPATIENT)
Dept: PSYCHIATRY | Facility: CLINIC | Age: 54
End: 2024-08-20

## 2024-08-20 DIAGNOSIS — F41.1 GENERALIZED ANXIETY DISORDER: ICD-10-CM

## 2024-08-20 DIAGNOSIS — F17.200 TOBACCO USE DISORDER: ICD-10-CM

## 2024-08-20 DIAGNOSIS — F31.32 BIPOLAR I DISORDER, MOST RECENT EPISODE DEPRESSED, MODERATE (HCC): Primary | ICD-10-CM

## 2024-08-20 PROCEDURE — 99213 OFFICE O/P EST LOW 20 MIN: CPT | Performed by: PSYCHIATRY & NEUROLOGY

## 2024-08-20 RX ORDER — QUETIAPINE FUMARATE 200 MG/1
200 TABLET, FILM COATED ORAL 2 TIMES DAILY
Qty: 60 TABLET | Refills: 3 | Status: SHIPPED | OUTPATIENT
Start: 2024-08-20

## 2024-08-20 RX ORDER — CLONAZEPAM 2 MG/1
2 TABLET ORAL 2 TIMES DAILY
Qty: 60 TABLET | Refills: 3 | Status: SHIPPED | OUTPATIENT
Start: 2024-08-20

## 2024-08-20 NOTE — PSYCH
After connecting through [employer], patient was verified with two unique identifiers. Patient confirmed that they were at Home/Office and were in [place] at the time of the appointment.    Patient (or authorized legal representative) was then informed that this was a Telemedicine visit and that the exam was being conducted confidentially over secure lines. My office door was closed.  No one else was in the room with me.  Patient acknowledged consent and understanding of privacy and security of the Telemedicine visit, and gave permission to have a telemedicine presenter stay in the room in order to assist with the history and to conduct the exam as needed.  I informed the patient that I have reviewed their record in Epic and presented the opportunity for them to ask any questions regarding the visit today.  The patient agreed to participate.    Chief Complaint:    Bipolar depression and anxiety    Interval Summary    Pt said that he has been doing good since his last visit. He said that he has been taking Klonopin 2.5 pills per day, 5 mg per day from 6 mg. He said he is doing well on this dose. He said that he is much less depressed and is able to cope better with his depression. He said that his anxiety is much better and he is able to distract himself by keeping himself busy. He said he is not getting physical issues from his anxiety and he does not feel overwhelmed with his anxiety. He said that he is able to able to keep himself busy during the day and denied any other stressors. He said he had a good birthday and his kids came for his birthday and he enjoyed that.     Psychiatric ROS    Sleep: Disturbed, 6 hours, feels tired during the day, naps during the day  Appetite: Good  Suicidal ideation: Denied  Homicidal ideation: Denied  Psychosis: None reported  Nelly: None reported   PTSD: None reported  Panic attacks: None reported  Memory issues: None reported  Med compliance: Good  Side effects:  "None reported    D+A Use    Alcohol: None  Nicotine: Smokes 3/4 pack of cigarettes daily  Other substances: None    Medical Issues  Acute medical issues at present: None  Last PCP Visit: 3 weeks ago, no issues, BP is stable and he is off BP meds    Medical Review of Systems    No fever/dizziness/blurred vision/cough/chest pain/shortness of breath/abdominal pain/nausea/vomiting/bladder or bowel problems/headache/weakness/rigidity or pain reported. Any symptoms reported by patient are listed in the interval summary.    Mental Status Exam    Motor: Normal gait and no abnormal movements  General appearance and behavior: Moderately kempt, calm, cooperative, pleasant, good eye contact, good rapport, no psychomotor abnormalities noted  Speech: Spontaneous with normal rate, normal volume and normal tone  Mood: \"good\"  Affect: Euthymic, congruent with mood, normal range, appropriate  Thought Process: linear  Thought content: Denied suicidal or homicidal ideation. No delusions elicited  Perception: Denied any auditory or visual hallucinations  Insight: Good  Judgment: Good  Orientation: Oriented to person, place and time  Attention/Concentration: Good  Memory - Grossly intact  Language - Fluent  Fund of Knowledge - Adequate    Assessment and Plan    Bipolar I disorder, most recent episode depressed, moderate - Continue quetiapine 200 mg tablet PO BID    Generalized anxiety disorder - Decrease clonazepam 2 mg PO to BID. Doing well on 5 m,g currently and is willing to go down to 4 mg daily    D/w pt about the risks of dependence, tolerance with klonopin and also the risk of falls and imbalance. He agreed to reduce the dose slowly.     The clinical diagnosis, course and prognosis were explained to the patient. Discussed with patient the clinical indications, interactions, benefits, the most common and serious side effects of all current medications. The alternative treatment options were discussed. The importance of continuing " in psychotherapy was reinstated. The patient was receptive and appeared to understand the information provided. Patient's concerns and questions were addressed to patient's satisfaction during the appointment, and the patient agreed to the treatment plan.    Patient is aware of adverse effects of use of alcohol and/or other substances on mental illness and is aware of the risk of combining alcohol and/or substances with the medications that are currently prescribed. The patient was advised go to the nearest emergency room or call 911 if new symptoms arise or existing symptoms worsen or has thoughts of hurting self or others.    Behavioral Health OP Treatment Plan    Name and Date of Birth:  Yusuf Valdes 54 y.o. 1970    Date of Treatment Plan: August 20, 2024    Diagnosis/Diagnoses:     1. Bipolar I disorder, most recent episode depressed, moderate (HCC)    2. Generalized anxiety disorder    3. Tobacco use disorder        Strengths/Personal Resources for Self-Care: Good insight, compliant with treatment, good family support    Area/Areas of need (in own words):    1. Long Term Goal: Management of symptoms  Target Date: 6 months  Person/Persons responsible for completion of goal: Patient    2.  Short Term Objective(s):  How will we reach this goal - Continue medications as prescribed  Target Date: 6 months  Person/Persons Responsible for Completion of Goal: Patient    Progress Towards Goals: Stable    Treatment Modality: Medication management every two months    Review due in 180 days from the date of this plan    Expected length of service: Follow up appointment every 1-2 months for maintenance    My Physician/PA/NP and I have developed this plan together and I agree to work on the goals and objectives. I understand the treatment goals that were developed for my treatment. I consent to the above treatment plan.    Follow up in 12 weeks    Start time - 400 pm    Stop time - 403 pm

## 2024-09-16 DIAGNOSIS — F31.32 BIPOLAR I DISORDER, MOST RECENT EPISODE DEPRESSED, MODERATE (HCC): ICD-10-CM

## 2024-09-16 DIAGNOSIS — F41.1 GENERALIZED ANXIETY DISORDER: ICD-10-CM

## 2024-09-17 RX ORDER — QUETIAPINE FUMARATE 200 MG/1
200 TABLET, FILM COATED ORAL 2 TIMES DAILY
Qty: 180 TABLET | Refills: 3 | Status: SHIPPED | OUTPATIENT
Start: 2024-09-17

## 2024-11-15 ENCOUNTER — TELEMEDICINE (OUTPATIENT)
Dept: PSYCHIATRY | Facility: CLINIC | Age: 54
End: 2024-11-15

## 2024-11-15 DIAGNOSIS — F17.200 TOBACCO USE DISORDER: ICD-10-CM

## 2024-11-15 DIAGNOSIS — F41.1 GENERALIZED ANXIETY DISORDER: ICD-10-CM

## 2024-11-15 DIAGNOSIS — F31.32 BIPOLAR I DISORDER, MOST RECENT EPISODE DEPRESSED, MODERATE (HCC): Primary | ICD-10-CM

## 2024-11-15 PROCEDURE — 99214 OFFICE O/P EST MOD 30 MIN: CPT | Performed by: PSYCHIATRY & NEUROLOGY

## 2024-11-15 RX ORDER — QUETIAPINE FUMARATE 200 MG/1
200 TABLET, FILM COATED ORAL 2 TIMES DAILY
Qty: 180 TABLET | Refills: 3 | Status: SHIPPED | OUTPATIENT
Start: 2024-11-15

## 2024-11-15 RX ORDER — CLONAZEPAM 2 MG/1
2 TABLET ORAL 2 TIMES DAILY
Qty: 60 TABLET | Refills: 3 | Status: SHIPPED | OUTPATIENT
Start: 2024-11-15

## 2024-11-15 NOTE — BH TREATMENT PLAN
TREATMENT PLAN (Medication Management Only)        [facility]    Name and Date of Birth:  Yusuf Valdes 54 y.o. 1970  Date of Treatment Plan: November 15, 2024  Diagnosis/Diagnoses:    1. Bipolar I disorder, most recent episode depressed, moderate (HCC)    2. Generalized anxiety disorder    3. Tobacco use disorder      Strengths/Personal Resources for Self-Care: taking medications as prescribed, independence.  Area/Areas of need (in own words): anxiety  1. Long Term Goal: continue improvement in anxiety.  Target Date:6 months - 5/15/2025  Person/Persons responsible for completion of goal: Edward  2.  Short Term Objective (s) - How will we reach this goal?:   A. Provider new recommended medication/dosage changes and/or continue medication(s): continue current medications as prescribed.  B. N/A.  C. N/A.  Target Date:6 months - 5/15/2025  Person/Persons Responsible for Completion of Goal: Edward  Progress Towards Goals: continuing treatment  Treatment Modality: medication management every 3 months  Review due 180 days from date of this plan: 6 months - 5/15/2025  Expected length of service: ongoing treatment  My Physician/PA/NP and I have developed this plan together and I agree to work on the goals and objectives. I understand the treatment goals that were developed for my treatment.

## 2024-11-15 NOTE — BH CRISIS PLAN
Client Name: Yusuf Valdes       Client YOB: 1970    Lilia Safety Plan      Creation Date: 11/15/24    Created By: Tina Lux MD       Step 1: Warning Signs:   Warning Signs   Anxiety            Step 2: Internal Coping Strategies:   Internal Coping Strategies   Watch TV   listen to music            Step 3: People and social settings that provide distraction:   Name Contact Information   GF             Step 4: People whom I can ask for help during a crisis:      Name Contact Information    GF       Step 5: Professionals or agencies I can contact during a crisis:      Clinican/Agency Name Phone Emergency Contact    [facility]          Crisis Phone Numbers:   Suicide Prevention Lifeline: Call or Text  982 Crisis Text Line: Text HOME to 053-900   Please note: Some [facility] do not have a separate number for Child/Adolescent specific crisis. If your county is not listed under Child/Adolescent, please call the adult number for your county      Adult Crisis Numbers: Child/Adolescent Crisis Numbers   [facility]: [phone] [facility]: [phone]   [facility]: [phone] [facility]: [phone]   [facility]: [phone] [place]: [phone]   [facility]: [phone] [place]: [phone]   [facility]: [phone]   [facility]: [phone]   [facility]: [phone]   [facility]: [phone] (daytime) [phone] (after hours, weekends, holidays)      Step 6: Making the environment safer (plan for lethal means safety):      Optional: What is most important to me and worth living for?      Lilia Safety Plan. My [employer]. Used with permission of the authors.

## 2024-11-15 NOTE — PSYCH
After connecting through [employer], patient was verified with two unique identifiers. Patient confirmed that they were at Home/Office and were in [place] at the time of the appointment.    Patient (or authorized legal representative) was then informed that this was a Telemedicine visit and that the exam was being conducted confidentially over secure lines. My office door was closed.  No one else was in the room with me.  Patient acknowledged consent and understanding of privacy and security of the Telemedicine visit, and gave permission to have a telemedicine presenter stay in the room in order to assist with the history and to conduct the exam as needed.  I informed the patient that I have reviewed their record in Epic and presented the opportunity for them to ask any questions regarding the visit today.  The patient agreed to participate.    Chief Complaint:    Bipolar depression and anxiety    Interval Summary    Pt said that he was not doing well for the two months when he reduced the Klonopin and he said he is feeling much better for last few weeks. He is taking 2 mg two times a day. He denied feeling depressed for the last few weeks. He said he feels anxious on and off and is coping better with his anxiety. He said that he could not function for few weeks when he reduced his klonopin. He denied any other stressors.    Psychiatric ROS    Sleep: Better, 7-8 hours, feels tired at times  Appetite: Good  Suicidal ideation: Denied  Homicidal ideation: Denied  Psychosis: None reported  Nelly: None reported   PTSD: None reported  Panic attacks: None reported  Memory issues: None reported  Med compliance: Good  Side effects: None reported    D+A Use    Alcohol: None  Nicotine: Smokes 3/4 pack of cigarettes daily  Other substances: None    Medical Issues  Acute medical issues at present: None  Last PCP Visit: 5 months ago, no issues, BP is stable and he is off BP meds., next appt in 2 weeks    Medical  "Review of Systems    No fever/dizziness/blurred vision/cough/chest pain/shortness of breath/abdominal pain/nausea/vomiting/bladder or bowel problems/headache/weakness/rigidity or pain reported. Any symptoms reported by patient are listed in the interval summary.    Mental Status Exam    Motor: Normal gait and no abnormal movements  General appearance and behavior: Moderately kempt, calm, cooperative, pleasant, good eye contact, good rapport, no psychomotor abnormalities noted  Speech: Spontaneous with normal rate, normal volume and normal tone  Mood: \"Better\"  Affect: Euthymic, congruent with mood, normal range, appropriate  Thought Process: linear  Thought content: Denied suicidal or homicidal ideation. No delusions elicited  Perception: Denied any auditory or visual hallucinations  Insight: Good  Judgment: Good  Orientation: Oriented to person, place and time  Attention/Concentration: Good  Memory - Grossly intact  Language - Fluent  Fund of Knowledge - Adequate    Assessment and Plan    Bipolar I disorder, most recent episode depressed, moderate - Continue quetiapine 200 mg tablet PO BID    Generalized anxiety disorder - Decrease clonazepam 2 mg PO to BID. Doing well on 5 m,g currently and is willing to go down to 4 mg daily    D/w pt about the risks of dependence, tolerance with klonopin and also the risk of falls and imbalance. He agreed to reduce the dose slowly.     The clinical diagnosis, course and prognosis were explained to the patient. Discussed with patient the clinical indications, interactions, benefits, the most common and serious side effects of all current medications. The alternative treatment options were discussed. The importance of continuing in psychotherapy was reinstated. The patient was receptive and appeared to understand the information provided. Patient's concerns and questions were addressed to patient's satisfaction during the appointment, and the patient agreed to the treatment " plan.    Patient is aware of adverse effects of use of alcohol and/or other substances on mental illness and is aware of the risk of combining alcohol and/or substances with the medications that are currently prescribed. The patient was advised go to the nearest emergency room or call 911 if new symptoms arise or existing symptoms worsen or has thoughts of hurting self or others.    Behavioral Health OP Treatment Plan    Name and Date of Birth:  Yusuf Valdes 54 y.o. 1970    Date of Treatment Plan: November 15, 2024    Diagnosis/Diagnoses:     1. Bipolar I disorder, most recent episode depressed, moderate (HCC)    2. Generalized anxiety disorder    3. Tobacco use disorder        Strengths/Personal Resources for Self-Care: Good insight, compliant with treatment, good family support    Area/Areas of need (in own words):    1. Long Term Goal: Management of symptoms  Target Date: 6 months  Person/Persons responsible for completion of goal: Patient    2.  Short Term Objective(s):  How will we reach this goal - Continue medications as prescribed  Target Date: 6 months  Person/Persons Responsible for Completion of Goal: Patient    Progress Towards Goals: Stable    Treatment Modality: Medication management every two months    Review due in 180 days from the date of this plan    Expected length of service: Follow up appointment every 1-2 months for maintenance    My Physician/PA/NP and I have developed this plan together and I agree to work on the goals and objectives. I understand the treatment goals that were developed for my treatment. I consent to the above treatment plan.    Follow up in 12 weeks    Start time - 400 pm    Stop time - 425 pm

## 2025-02-07 ENCOUNTER — TELEMEDICINE (OUTPATIENT)
Dept: PSYCHIATRY | Facility: CLINIC | Age: 55
End: 2025-02-07

## 2025-02-07 DIAGNOSIS — F17.200 TOBACCO USE DISORDER: ICD-10-CM

## 2025-02-07 DIAGNOSIS — F31.32 BIPOLAR I DISORDER, MOST RECENT EPISODE DEPRESSED, MODERATE (HCC): Primary | ICD-10-CM

## 2025-02-07 DIAGNOSIS — F41.1 GENERALIZED ANXIETY DISORDER: ICD-10-CM

## 2025-02-07 PROCEDURE — 99214 OFFICE O/P EST MOD 30 MIN: CPT | Performed by: PSYCHIATRY & NEUROLOGY

## 2025-02-07 RX ORDER — CLONAZEPAM 2 MG/1
2 TABLET ORAL 2 TIMES DAILY
Qty: 60 TABLET | Refills: 3 | Status: SHIPPED | OUTPATIENT
Start: 2025-02-07

## 2025-02-07 RX ORDER — QUETIAPINE FUMARATE 200 MG/1
200 TABLET, FILM COATED ORAL 2 TIMES DAILY
Qty: 60 TABLET | Refills: 3 | Status: SHIPPED | OUTPATIENT
Start: 2025-02-07

## 2025-02-07 NOTE — PSYCH
After connecting through [employer] by [employer], patient was verified with two unique identifiers. Patient confirmed that they were at Home/Office and were in [place] at the time of the appointment.    Patient (or authorized legal representative) was then informed that this was a Telemedicine visit and that the exam was being conducted confidentially over secure lines. My office door was closed.  No one else was in the room with me.  Patient acknowledged consent and understanding of privacy and security of the Telemedicine visit, and gave permission to have a telemedicine presenter stay in the room in order to assist with the history and to conduct the exam as needed.  I informed the patient that I have reviewed their record in Epic and presented the opportunity for them to ask any questions regarding the visit today.  The patient agreed to participate.    Chief Complaint:    Bipolar depression and anxiety    Interval Summary    Pt said that he is doing fine since his last visit. He said that he had a good holiday season with family. He said that he was sick a stomach bug for 2 weeks last month and doing better. He denied feeling depressed in the last few months and he is doing well with it. He said that he feels anxious, on and off, mostly situational when things don't his way and he said the Klonopin helps. He said he is not able to function well without his Klonopin and he plans to go to [place] in March for two months. He denied any concerns and his mom is supportive. Advised him to make an appt prior to going to [place] so he can get 3 month supply of meds.    Psychiatric ROS    Sleep: Better, 7-8 hours, feels rested  Appetite: Good  Suicidal ideation: Denied  Homicidal ideation: Denied  Psychosis: None reported  Nelly: None reported   PTSD: None reported  Panic attacks: None reported  Memory issues: None reported  Med compliance: Good  Side effects: None reported    D+A Use    Alcohol: None  Nicotine: Smokes  "3/4 pack of cigarettes daily  Other substances: None    Medical Issues  Acute medical issues at present: None  Last PCP Visit: December 2024    Medical Review of Systems    No fever/dizziness/blurred vision/cough/chest pain/shortness of breath/abdominal pain/nausea/vomiting/bladder or bowel problems/headache/weakness/rigidity or pain reported. Any symptoms reported by patient are listed in the interval summary.    Mental Status Exam    Motor: Normal gait and no abnormal movements  General appearance and behavior: Moderately kempt, calm, cooperative, pleasant, good eye contact, good rapport, no psychomotor abnormalities noted  Speech: Spontaneous with normal rate, normal volume and normal tone  Mood: \"Good\"  Affect: Euthymic, congruent with mood, normal range, appropriate  Thought Process: linear  Thought content: Denied suicidal or homicidal ideation. No delusions elicited  Perception: Denied any auditory or visual hallucinations  Insight: Good  Judgment: Good  Orientation: Oriented to person, place and time  Attention/Concentration: Good  Memory - Grossly intact  Language - Fluent  Fund of Knowledge - Adequate    Assessment and Plan    Bipolar I disorder, most recent episode depressed, moderate - Continue quetiapine 200 mg tablet PO BID    Generalized anxiety disorder - Continue clonazepam 2 mg PO to BID. T/c reducing the dose in next visit.    D/w pt about the risks of dependence, tolerance with klonopin and also the risk of falls and imbalance. He agreed to reduce the dose slowly.    The clinical diagnosis, course and prognosis were explained to the patient. Discussed with patient the clinical indications, interactions, benefits, the most common and serious side effects of all current medications. The alternative treatment options were discussed. The importance of continuing in psychotherapy was reinstated. The patient was receptive and appeared to understand the information provided. Patient's concerns and " questions were addressed to patient's satisfaction during the appointment, and the patient agreed to the treatment plan.    Patient is aware of adverse effects of use of alcohol and/or other substances on mental illness and is aware of the risk of combining alcohol and/or substances with the medications that are currently prescribed. The patient was advised go to the nearest emergency room or call 911 if new symptoms arise or existing symptoms worsen or has thoughts of hurting self or others.    Behavioral Health OP Treatment Plan    Name and Date of Birth:  Yusuf Valdes 54 y.o. 1970    Date of Treatment Plan: February 7, 2025    Diagnosis/Diagnoses:     1. Bipolar I disorder, most recent episode depressed, moderate (HCC)    2. Generalized anxiety disorder    3. Tobacco use disorder        Strengths/Personal Resources for Self-Care: Good insight, compliant with treatment, good family support    Area/Areas of need (in own words):    1. Long Term Goal: Management of symptoms  Target Date: 6 months  Person/Persons responsible for completion of goal: Patient    2.  Short Term Objective(s):  How will we reach this goal - Continue medications as prescribed  Target Date: 6 months  Person/Persons Responsible for Completion of Goal: Patient    Progress Towards Goals: Stable    Treatment Modality: Medication management every two months    Review due in 180 days from the date of this plan    Expected length of service: Follow up appointment every 1-2 months for maintenance    My Physician/PA/NP and I have developed this plan together and I agree to work on the goals and objectives. I understand the treatment goals that were developed for my treatment. I consent to the above treatment plan.    Follow up in 12 weeks    Start time - 405 pm    Stop time - 430 pm

## 2025-02-07 NOTE — BH CRISIS PLAN
Client Name: Yusuf Valdes       Client YOB: 1970    Lilia Safety Plan      Creation Date: 11/15/24    Created By: Tina Lux MD       Step 1: Warning Signs:   Warning Signs   Anxiety            Step 2: Internal Coping Strategies:   Internal Coping Strategies   Watch TV   listen to music            Step 3: People and social settings that provide distraction:   Name Contact Information   GF             Step 4: People whom I can ask for help during a crisis:      Name Contact Information    GF       Step 5: Professionals or agencies I can contact during a crisis:      Clinican/Agency Name Phone Emergency Contact    [facility]          Crisis Phone Numbers:   Suicide Prevention Lifeline: Call or Text  985 Crisis Text Line: Text HOME to 269-516   Please note: Some [facility] do not have a separate number for Child/Adolescent specific crisis. If your county is not listed under Child/Adolescent, please call the adult number for your county      Adult Crisis Numbers: Child/Adolescent Crisis Numbers   [facility]: [phone] [facility]: [phone]   [facility]: [phone] [facility]: [phone]   [facility]: [phone] [place]: [phone]   [facility]: [phone] [place]: [phone]   [facility]: [phone]   [facility]: [phone]   [facility]: [phone]   [facility]: [phone] (daytime) [phone] (after hours, weekends, holidays)      Step 6: Making the environment safer (plan for lethal means safety):      Optional: What is most important to me and worth living for?      Lilia Safety Plan. [employer]. Used with permission of the authors.

## 2025-03-17 DIAGNOSIS — Z79.899 ENCOUNTER FOR LONG-TERM (CURRENT) USE OF MEDICATIONS: Primary | ICD-10-CM

## 2025-04-07 ENCOUNTER — TELEPHONE (OUTPATIENT)
Dept: PSYCHIATRY | Facility: CLINIC | Age: 55
End: 2025-04-07

## 2025-04-07 NOTE — TELEPHONE ENCOUNTER
Called pt and left VM regarding cancelling upcoming appt with Dr. Lux due to provider changing her schedule.    Gave [facility] phone number to call back and schedule with provider.

## 2025-04-15 ENCOUNTER — TELEPHONE (OUTPATIENT)
Dept: PSYCHIATRY | Facility: CLINIC | Age: 55
End: 2025-04-15

## 2025-04-15 NOTE — TELEPHONE ENCOUNTER
Called pt and left VM regarding scheduling 12wk f/u with Dr. Lux.    Gave [facility] phone number to call back and schedule with provider.

## 2025-04-25 ENCOUNTER — TELEPHONE (OUTPATIENT)
Dept: PSYCHIATRY | Facility: CLINIC | Age: 55
End: 2025-04-25

## 2025-04-25 NOTE — TELEPHONE ENCOUNTER
Called pt and left VM regarding scheduling 12wk f/u appt with Dr. Lux.    Gave [facility] phone number to call and schedule with provider.

## 2025-04-30 DIAGNOSIS — F41.1 GENERALIZED ANXIETY DISORDER: ICD-10-CM

## 2025-04-30 DIAGNOSIS — F31.32 BIPOLAR I DISORDER, MOST RECENT EPISODE DEPRESSED, MODERATE (HCC): ICD-10-CM

## 2025-05-01 RX ORDER — QUETIAPINE FUMARATE 200 MG/1
200 TABLET, FILM COATED ORAL 2 TIMES DAILY
Qty: 180 TABLET | Refills: 0 | Status: SHIPPED | OUTPATIENT
Start: 2025-05-01

## 2025-05-12 ENCOUNTER — TELEPHONE (OUTPATIENT)
Dept: PSYCHIATRY | Facility: CLINIC | Age: 55
End: 2025-05-12

## 2025-05-12 NOTE — TELEPHONE ENCOUNTER
Spoke to client's mother about client needing to sign the Acknowledgment of Self Pay and the Virtual Care Behavioral Health Consent in the Documents Center of My Chart prior to appointment with Dr. Lux at 4:00 pm 5/13/2025.

## 2025-05-13 ENCOUNTER — TELEMEDICINE (OUTPATIENT)
Dept: PSYCHIATRY | Facility: CLINIC | Age: 55
End: 2025-05-13

## 2025-05-13 DIAGNOSIS — F31.32 BIPOLAR I DISORDER, MOST RECENT EPISODE DEPRESSED, MODERATE (HCC): ICD-10-CM

## 2025-05-13 DIAGNOSIS — F17.200 TOBACCO USE DISORDER: ICD-10-CM

## 2025-05-13 DIAGNOSIS — F41.1 GENERALIZED ANXIETY DISORDER: ICD-10-CM

## 2025-05-13 DIAGNOSIS — F31.32 BIPOLAR I DISORDER, MOST RECENT EPISODE DEPRESSED, MODERATE (HCC): Primary | ICD-10-CM

## 2025-05-13 PROCEDURE — 99213 OFFICE O/P EST LOW 20 MIN: CPT | Performed by: PSYCHIATRY & NEUROLOGY

## 2025-05-13 RX ORDER — QUETIAPINE FUMARATE 200 MG/1
200 TABLET, FILM COATED ORAL 2 TIMES DAILY
Qty: 60 TABLET | Refills: 3 | Status: SHIPPED | OUTPATIENT
Start: 2025-05-13 | End: 2025-05-15

## 2025-05-13 RX ORDER — CLONAZEPAM 2 MG/1
2 TABLET ORAL 2 TIMES DAILY
Qty: 60 TABLET | Refills: 3 | Status: SHIPPED | OUTPATIENT
Start: 2025-05-13

## 2025-05-13 NOTE — BH CRISIS PLAN
Client Name: Yusuf Valdes       Client YOB: 1970    Lilia Safety Plan      Creation Date: 11/15/24    Created By: Tina Lux MD       Step 1: Warning Signs:   Warning Signs   Anxiety            Step 2: Internal Coping Strategies:   Internal Coping Strategies   Watch TV   listen to music            Step 3: People and social settings that provide distraction:   Name Contact Information   GF             Step 4: People whom I can ask for help during a crisis:      Name Contact Information    GF       Step 5: Professionals or agencies I can contact during a crisis:      Clinican/Agency Name Phone Emergency Contact    [facility]          Crisis Phone Numbers:   Suicide Prevention Lifeline: Call or Text  984 Crisis Text Line: Text HOME to 284-592   Please note: Some [facility] do not have a separate number for Child/Adolescent specific crisis. If your county is not listed under Child/Adolescent, please call the adult number for your county      Adult Crisis Numbers: Child/Adolescent Crisis Numbers   [facility]: [phone] [facility]: [phone]   [facility]: [phone] [facility]: [phone]   [facility]: [phone] [place]: [phone]   [facility]: [phone] [place]: [phone]   [facility]: [phone]   [facility]: [phone]   [facility]: [phone]   [facility]: [phone] (daytime) [phone] (after hours, weekends, holidays)      Step 6: Making the environment safer (plan for lethal means safety):      Optional: What is most important to me and worth living for?      Lilia Safety Plan. [employer]. Used with permission of the authors.

## 2025-05-13 NOTE — PSYCH
Administrative Statements   Encounter provider Tina Lux MD    The Patient is located at Home and in the following state in which I hold an active license [place].    The patient was identified by name and date of birth. Yusuf Valdes was informed that this is a telemedicine visit and that the visit is being conducted through the Epic Embedded platform. He agrees to proceed..  My office door was closed. No one else was in the room.  He acknowledged consent and understanding of privacy and security of the video platform. The patient has agreed to participate and understands they can discontinue the visit at any time.    I have spent a total time of 25 minutes in caring for this patient on the day of the visit/encounter including Counseling / Coordination of care, Documenting in the medical record, Reviewing/placing orders in the medical record (including tests, medications, and/or procedures), and Obtaining or reviewing history  , not including the time spent for establishing the audio/video connection.    Chief Complaint:    Bipolar depression and anxiety    Interval Summary    Pt said that he is doing good since his last visit. He denied feeling depressed in the last few weeks. He said he feels anxious at times, mostly around doctor's appointments and when he is outside at times. He said that he has not seen his son in 10 years and he is leaving tomorrow to visit his son for a week. He lives with his mom and he is on SSI. He said he does all the yard work and keeps himself busy with it. He denied any other current stressors.    Psychiatric ROS    Sleep: Good, 7-9 hours, feels rested  Appetite: Good  Suicidal ideation: Denied  Homicidal ideation: Denied  Psychosis: None reported  Nelly: None reported   PTSD: None reported  Panic attacks: None reported  Memory issues: None reported  Med compliance: Good  Side effects: None reported    D+A Use    Alcohol: None  Nicotine: Smokes 3/4 pack of cigarettes daily  Other  "substances: None    Medical Issues  Acute medical issues at present: None  Last PCP Visit: December 2024    Medical Review of Systems    No fever/dizziness/blurred vision/cough/chest pain/shortness of breath/abdominal pain/nausea/vomiting/bladder or bowel problems/headache/weakness/rigidity or pain reported. Any symptoms reported by patient are listed in the interval summary.    Mental Status Exam    Motor: Normal gait and no abnormal movements  General appearance and behavior: Moderately kempt, calm, cooperative, pleasant, good eye contact, good rapport, no psychomotor abnormalities noted  Speech: Spontaneous with normal rate, normal volume and normal tone  Mood: \"Good\"  Affect: Euthymic, congruent with mood, normal range, appropriate  Thought Process: linear  Thought content: Denied suicidal or homicidal ideation. No delusions elicited  Perception: Denied any auditory or visual hallucinations  Insight: Good  Judgment: Good  Orientation: Oriented to person, place and time  Attention/Concentration: Good  Memory - Grossly intact  Language - Fluent  Fund of Knowledge - Adequate    Assessment and Plan    Bipolar I disorder, most recent episode depressed, moderate - Continue quetiapine 200 mg tablet PO BID    Generalized anxiety disorder - Continue clonazepam 2 mg PO to BID. Refusing to change his meds as he is leaving to [place] tomorrow.    D/w pt about the risks of dependence, tolerance with klonopin and also the risk of falls and imbalance. He agreed to reduce the dose slowly.    The clinical diagnosis, course and prognosis were explained to the patient. Discussed with patient the clinical indications, interactions, benefits, the most common and serious side effects of all current medications. The alternative treatment options were discussed. The importance of continuing in psychotherapy was reinstated. The patient was receptive and appeared to understand the information provided. Patient's concerns and questions " were addressed to patient's satisfaction during the appointment, and the patient agreed to the treatment plan.    Patient is aware of adverse effects of use of alcohol and/or other substances on mental illness and is aware of the risk of combining alcohol and/or substances with the medications that are currently prescribed. The patient was advised go to the nearest emergency room or call 911 if new symptoms arise or existing symptoms worsen or has thoughts of hurting self or others.    Follow up in 12 weeks    Start time - 400 pm    Stop time - 425 pm

## 2025-05-13 NOTE — BH TREATMENT PLAN
TREATMENT PLAN (Medication Management Only)        [facility]    Name and Date of Birth:  Yusuf Valdes 54 y.o. 1970  MRN: [id number]  Date of Treatment Plan: May 13, 2025  Diagnosis/Diagnoses:    1. Bipolar I disorder, most recent episode depressed, moderate (HCC)    2. Generalized anxiety disorder    3. Tobacco use disorder      Strengths/Personal Resources for Self-Care: supportive family, taking medications as prescribed.  Area/Areas of need (in own words): mood instability  1. Long Term Goal:   continue improvement in mood stability.  Target Date:6 months - 11/13/2025  Person/Persons responsible for completion of goal: Edward  2.  Short Term Objective (s) - How will we reach this goal?:   A.  Provider new recommended medication/dosage changes and/or continue medication(s): continue current medications as prescribed.  B.  N/A.  C.  N/A.  Target Date:6 months - 11/13/2025  Person/Persons Responsible for Completion of Goal: Edward  Progress Towards Goals: continuing treatment  Treatment Modality: medication management every 2 months  Review due 180 days from date of this plan: 6 months - 11/13/2025  Expected length of service: ongoing treatment unless revised  My Physician/PA/NP and I have developed this plan together and I agree to work on the goals and objectives. I understand the treatment goals that were developed for my treatment.   Electronic Signatures: on file (unless signed below)    Tina Lux MD 05/13/25

## 2025-05-15 RX ORDER — QUETIAPINE FUMARATE 200 MG/1
TABLET, FILM COATED ORAL
Qty: 180 TABLET | Refills: 3 | Status: SHIPPED | OUTPATIENT
Start: 2025-05-15

## 2025-08-14 ENCOUNTER — TELEMEDICINE (OUTPATIENT)
Dept: PSYCHIATRY | Facility: CLINIC | Age: 55
End: 2025-08-14